# Patient Record
Sex: FEMALE | Race: WHITE
[De-identification: names, ages, dates, MRNs, and addresses within clinical notes are randomized per-mention and may not be internally consistent; named-entity substitution may affect disease eponyms.]

---

## 2018-12-31 ENCOUNTER — HOSPITAL ENCOUNTER (EMERGENCY)
Dept: HOSPITAL 62 - ER | Age: 28
Discharge: HOME | End: 2018-12-31
Payer: OTHER GOVERNMENT

## 2018-12-31 VITALS — SYSTOLIC BLOOD PRESSURE: 137 MMHG | DIASTOLIC BLOOD PRESSURE: 88 MMHG

## 2018-12-31 DIAGNOSIS — E66.01: ICD-10-CM

## 2018-12-31 DIAGNOSIS — K04.7: Primary | ICD-10-CM

## 2018-12-31 DIAGNOSIS — F17.210: ICD-10-CM

## 2018-12-31 DIAGNOSIS — K08.89: ICD-10-CM

## 2018-12-31 DIAGNOSIS — H92.01: ICD-10-CM

## 2018-12-31 DIAGNOSIS — K02.9: ICD-10-CM

## 2018-12-31 PROCEDURE — 64400 NJX AA&/STRD TRIGEMINAL NRV: CPT

## 2018-12-31 PROCEDURE — 99282 EMERGENCY DEPT VISIT SF MDM: CPT

## 2018-12-31 NOTE — ER DOCUMENT REPORT
ED Oral Problem





- General


Chief Complaint: Toothache


Stated Complaint: TOOTHACHE


Time Seen by Provider: 12/31/18 15:43


Mode of Arrival: Ambulatory


Information source: Patient


Notes: 





Patient is a well-nourished well-developed 28-year-old female comes emergency 

room complaining of severe dental pain.  Patient states that started 

approximately 2 weeks ago.  She had schedule appointment to see a dentist a 

family emergency came up and she could not make it.  And then they went to 

[place] for the holidays.  While in [place] the tooth flared up again and 

her sister happened to have some clindamycin at home so she is taking a total of

3 doses of clindamycin on her way back home yesterday.  She has complaint of 

right upper dental pain with radiation into the right ear and down into the ri

ght jaw.  She denies any fevers but states that she has not been able to eat or 

drink anything in the past 36 hours.  It is aggravated by anything from 

palpation to heat and cold sensations.  She is attempted everything 

over-the-counter to she could find including numbing gels.  The do not work.  

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 

continues to smoke 1/4 pack of cigarettes a day.


TRAVEL OUTSIDE OF THE [place] IN LAST 30 DAYS: No





- HPI


Onset: Other - 2 weeks with a different I have no "pain


Onset: Gradual


Quality of pain: Achy, Sharp


Severity: Moderate


Pain Level: 3


Context: Recent antibiotic use


Sore throat: Mild


Swollen jaw/face: Moderate


Associated symptoms: Earache, Facial pain, Jaw pain, Toothache


Worsened by: Cold


Relieved by: Nothing


Similar symptoms previously: Yes


Recently seen / treated by doctor/dentist: No





- Related Data


Allergies/Adverse Reactions: 


                                        





No Known Allergies Allergy (Verified 12/31/18 13:32)


   











Past Medical History





- General


Information source: Patient





- Social History


Smoking Status: Current Every Day Smoker


Cigarette use (# per day): Yes - Quarter pack a day


Chew tobacco use (# tins/day): No


Smoking Education Provided: Yes


Family History: Reviewed & Not Pertinent


Patient has suicidal ideation: No


Patient has homicidal ideation: No





- Past Medical History


Cardiac Medical History: 


   Denies: Hx Coronary Artery Disease, Hx Heart Attack, Hx Hypertension


Pulmonary Medical History: 


   Denies: Hx Asthma, Hx Bronchitis, Hx COPD, Hx Pneumonia


Neurological Medical History: Denies: Hx Cerebrovascular Accident, Hx Seizures


Renal/ Medical History: Denies: Hx Peritoneal Dialysis


Musculoskeletal Medical History: Denies Hx Arthritis


Past Surgical History: Reports: Hx Cholecystectomy





- Immunizations


Hx Diphtheria, Pertussis, Tetanus Vaccination: Yes - 2012





Review of Systems





- Review of Systems


Constitutional: No symptoms reported


EENT: Ear pain, Dental problem


Cardiovascular: No symptoms reported


Respiratory: No symptoms reported


Gastrointestinal: No symptoms reported


Genitourinary: No symptoms reported


Female Genitourinary: No symptoms reported


Musculoskeletal: No symptoms reported


Skin: No symptoms reported


Hematologic/Lymphatic: No symptoms reported


Neurological/Psychological: No symptoms reported


-: Yes All other systems reviewed and negative





Physical Exam





- Vital signs


Vitals: 





                                        











Temp Pulse Resp BP Pulse Ox


 


 98.7 F   72   18   135/92 H  97 


 


 12/31/18 13:42  12/31/18 13:42  12/31/18 13:42  12/31/18 13:42  12/31/18 13:42











Interpretation: Hypertensive





- Notes


Notes: 





PHYSICAL EXAMINATION:





GENERAL:  the patient is a well-nourished well-developed 28-year-old morbidly 

obese female who is in no apparent distress on physical exam today but who does 

appear in moderate discomfort in moderate pain.  She is holding the right side 

of her face with an ice bag.





HEAD:normocephalic.  Examination of patient's right side of her face does show 

that there is some mild swelling along upper jawline almost extending from the 

zygomatic process down towards the front.  There is no fluctuance felt there is 

no severe induration felt it feels like inflamed tissue.  Very light fluffy but 

not fluctuant.  Mild erythema noted in the local area as well.





EYES: Pupils equal round and reactive to light, extraocular movements intact, 

conjunctiva are normal.





ENT: Examination patient's area of complaint is her right upper back molar #3.  

The tooth has decayed out is rotted into the middle and this goes up inside the 

gum that shows a decayed area.  The discharge from around the gum line is noted 

when pressure is applied.  This is a start contrast to the rest of her teeth 

which look relatively good.





NECK: Normal range of motion, supple without lymphadenopathy





LUNGS: Breath sounds clear to auscultation bilaterally and equal.  No wheezes 

rales or rhonchi.





HEART: Regular rate and rhythm without murmurs





Musculoskeletal: Normal range of motion, no pitting or edema.  No cyanosis.





NEUROLOGICAL:  Normal speech, normal gait.  Normal sensory, motor exams





PSYCH: Normal mood, normal affect.





SKIN: Warm, Dry, normal turgor, no rashes or lesions noted.





Course





- Re-evaluation


Re-evalutation: 





12/31/18 16:13


I have offered the patient a dental block and she is excepted.  I will place her

on a continue her on the clindamycin she started.  I will give her some pain 

medications.  I have looked her up on the [place]  aware line she does

not have any history of narcotic seeking behavior or even getting filled any 

pain medications from our state.


12/31/18 16:15








My procedure note dental block





I used 1 mL of 1% lidocaine without epi and 1 mL of 0.5% Marcaine without epi I 

then recline patient on the gurney I inserted the needle just anterior to the 

decayed tooth at the gum cheek line on the upper right side.  I slowly injected 

the area as I injected it patient seem to be getting relief within seconds.  She

tolerated this very well was very happy with the outcome and there were no 

complications.





- Vital Signs


Vital signs: 





                                        











Temp Pulse Resp BP Pulse Ox


 


 98.7 F   72   18   135/92 H  97 


 


 12/31/18 13:42  12/31/18 13:42  12/31/18 13:42  12/31/18 13:42  12/31/18 13:42














Discharge





- Discharge


Clinical Impression: 


 Dental infection, Dental decay





Condition: Stable


Disposition: HOME, SELF-CARE


Instructions:  [facility], Oral Narcotic Medication ([facility]), 

Toothache ([facility]), Clindamycin ([facility])


Additional Instructions: 


Dental Infection or Abscess





     You have an infection, perhaps an abscess (pus formation) of the gum around

one of your teeth, which is probably decayed.  If there is an abscess, it may 

drain on its own or it may need to be opened or lanced.  Severe swelling or 

drainage around a tooth usually means a deep dental abscess which usually 

requires evaluation and treatment by a dentist or oral surgeon.  Antibiotics may

be prescribed while awaiting dental treatment.


     If you develop high fever with chills, worsening pain, or increasing 

swelling in the area, see a dentist or oral surgeon immediately or return to the

Emergency Department immediately.








As of informed you were not dentist so I cannot fix the problem but we can help 

it.  I am going to continue on the clindamycin 4 times a day I am giving some 

pain medication as well.  And it is imperative that you see a dentist as soon as

possible.  This is something that if he gets out of hand is going to get so bad 

that you could have loss of hearing or vision.  Should you have any concerns or 

problems over 





Prescriptions: 


Clindamycin HCl 300 mg PO Q6 #1 capsule


Hydrocodone/Acetaminophen [Norco 7.5-325 Tablet] 1 each PO Q4 PRN #15 tablet


 PRN Reason: 


Ibuprofen [Motrin 800 mg Tablet] 800 mg PO Q8H PRN #30 tab


 PRN Reason: 


Forms:  Elevated Blood Pressure


Referrals: 


[facility] [NO LOCAL MD] - Follow up as needed